# Patient Record
Sex: FEMALE | Race: WHITE | NOT HISPANIC OR LATINO | Employment: PART TIME | URBAN - METROPOLITAN AREA
[De-identification: names, ages, dates, MRNs, and addresses within clinical notes are randomized per-mention and may not be internally consistent; named-entity substitution may affect disease eponyms.]

---

## 2024-01-04 ENCOUNTER — OFFICE VISIT (OUTPATIENT)
Dept: URGENT CARE | Facility: CLINIC | Age: 19
End: 2024-01-04
Payer: MEDICAID

## 2024-01-04 VITALS
DIASTOLIC BLOOD PRESSURE: 86 MMHG | HEIGHT: 65 IN | BODY MASS INDEX: 21.66 KG/M2 | SYSTOLIC BLOOD PRESSURE: 125 MMHG | WEIGHT: 130 LBS | HEART RATE: 94 BPM | OXYGEN SATURATION: 98 % | TEMPERATURE: 99 F | RESPIRATION RATE: 18 BRPM

## 2024-01-04 DIAGNOSIS — S61.212A LACERATION OF RIGHT MIDDLE FINGER WITHOUT FOREIGN BODY WITHOUT DAMAGE TO NAIL, INITIAL ENCOUNTER: Primary | ICD-10-CM

## 2024-01-04 PROCEDURE — 99213 OFFICE O/P EST LOW 20 MIN: CPT | Mod: S$PBB,25,, | Performed by: FAMILY MEDICINE

## 2024-01-04 PROCEDURE — 12001 RPR S/N/AX/GEN/TRNK 2.5CM/<: CPT | Mod: PBBFAC | Performed by: FAMILY MEDICINE

## 2024-01-04 PROCEDURE — 99203 OFFICE O/P NEW LOW 30 MIN: CPT | Mod: PBBFAC | Performed by: FAMILY MEDICINE

## 2024-01-04 RX ORDER — FLUOXETINE 10 MG/1
CAPSULE ORAL
COMMUNITY
Start: 2023-10-13

## 2024-01-04 RX ORDER — FLUOXETINE HYDROCHLORIDE 40 MG/1
40 CAPSULE ORAL
COMMUNITY
Start: 2023-12-11

## 2024-01-04 RX ORDER — NORETHINDRONE ACETATE AND ETHINYL ESTRADIOL, ETHINYL ESTRADIOL AND FERROUS FUMARATE 1MG-10(24)
1 KIT ORAL
COMMUNITY

## 2024-01-04 RX ORDER — FLUOXETINE 20 MG/1
20 TABLET ORAL
COMMUNITY
Start: 2023-10-09 | End: 2024-01-07

## 2024-01-04 RX ORDER — BUPROPION HYDROCHLORIDE 150 MG/1
150 TABLET ORAL EVERY MORNING
COMMUNITY
Start: 2023-12-11

## 2024-01-04 RX ORDER — DULOXETIN HYDROCHLORIDE 30 MG/1
30 CAPSULE, DELAYED RELEASE ORAL EVERY MORNING
COMMUNITY
Start: 2023-10-04

## 2024-01-04 RX ORDER — HYDROXYZINE PAMOATE 25 MG/1
CAPSULE ORAL
COMMUNITY
Start: 2023-10-12

## 2024-01-05 NOTE — PROGRESS NOTES
"Subjective:       Patient ID: Brooke Diaz is a 18 y.o. female.    Vitals:  height is 5' 5" (1.651 m) and weight is 59 kg (130 lb). Her oral temperature is 98.6 °F (37 °C). Her blood pressure is 125/86 and her pulse is 94. Her respiration is 18 and oxygen saturation is 98%.     Chief Complaint: Laceration (Cut middle finger on right hand with  knife.)    Cut the pulp of the right middle finger with a clean knife while trying to open a can prior to arrival.  Had some bleeding.  No other injury.  No nail involvement.  Next tetanus vaccine due in 2025        Objective:   Physical Exam   Constitutional: She appears well-developed.  Non-toxic appearance. She does not appear ill. No distress.   Abdominal: Normal appearance.   Musculoskeletal:        Hands:    Neurological: no focal deficit. She is alert. She displays no weakness.   Skin: Skin is not diaphoretic. Capillary refill takes less than 2 seconds.   Psychiatric: Her behavior is normal. Mood normal.   Nursing note and vitals reviewed.        Assessment:     1. Laceration of right middle finger without foreign body without damage to nail, initial encounter          Plan:   Non tetanus prone wound.    Wound cleaned.  Flap anchored with adhesive glue.  Pressure dressing applied.  Patient will removed tomorrow in clean before going to work.  We applied dressing and protect as discussed.  Please follow patient education material.  Watch for infection as we discussed and return to urgent care if any concern.      Laceration of right middle finger without foreign body without damage to nail, initial encounter        Please note: This chart was completed via voice to text dictation. It may contain typographical/word recognition errors. If there are any questions, please contact the provider for final clarification.      "

## 2024-01-05 NOTE — PROCEDURES
"Laceration Repair    Date/Time: 2024 7:45 PM    Performed by: Daron Hess MD  Authorized by: Daron Hess MD  Consent Done: Yes  Consent: Written consent obtained.  Risks and benefits: risks, benefits and alternatives were discussed  Consent given by: patient  Patient understanding: patient states understanding of the procedure being performed  Patient consent: the patient's understanding of the procedure matches consent given  Procedure consent: procedure consent matches procedure scheduled  Relevant documents: relevant documents present and verified  Site marked: the operative site was marked  Imaging studies: imaging studies available  Patient identity confirmed:  and name  Time out: Immediately prior to procedure a "time out" was called to verify the correct patient, procedure, equipment, support staff and site/side marked as required.  Body area: upper extremity  Location details: right long finger  Laceration length: 1 cm  Foreign bodies: no foreign bodies  Tendon involvement: none  Nerve involvement: none  Vascular damage: no    Patient sedated: no  Irrigation solution: saline  Irrigation method: syringe  Amount of cleaning: standard  Debridement: none  Degree of undermining: none  Skin closure: glue  Patient tolerance: Patient tolerated the procedure well with no immediate complications        "

## 2025-04-01 DIAGNOSIS — R22.32 MASS OF FINGER OF LEFT HAND: Primary | ICD-10-CM
